# Patient Record
Sex: MALE | Race: WHITE | NOT HISPANIC OR LATINO
[De-identification: names, ages, dates, MRNs, and addresses within clinical notes are randomized per-mention and may not be internally consistent; named-entity substitution may affect disease eponyms.]

---

## 2020-12-13 ENCOUNTER — TRANSCRIPTION ENCOUNTER (OUTPATIENT)
Age: 65
End: 2020-12-13

## 2022-12-03 ENCOUNTER — EMERGENCY (EMERGENCY)
Facility: HOSPITAL | Age: 67
LOS: 1 days | Discharge: HOME | End: 2022-12-03
Attending: EMERGENCY MEDICINE | Admitting: EMERGENCY MEDICINE
Payer: MEDICARE

## 2022-12-03 VITALS
SYSTOLIC BLOOD PRESSURE: 143 MMHG | HEIGHT: 67 IN | DIASTOLIC BLOOD PRESSURE: 84 MMHG | RESPIRATION RATE: 17 BRPM | OXYGEN SATURATION: 98 % | HEART RATE: 55 BPM | TEMPERATURE: 98 F | WEIGHT: 214.95 LBS

## 2022-12-03 PROCEDURE — 99284 EMERGENCY DEPT VISIT MOD MDM: CPT

## 2022-12-03 NOTE — ED PROVIDER NOTE - PATIENT PORTAL LINK FT
You can access the FollowMyHealth Patient Portal offered by [facility] by registering at the following website: [email or web address]. By joining [employer]’s FollowMyHealth portal, you will also be able to view your health information using other applications (apps) compatible with our system.

## 2022-12-03 NOTE — ED PROVIDER NOTE - NS ED ROS FT
Constitutional: No fever. No chills.  Eyes: No redness. No discharge. No vision change. +floaters.  ENT: No sore throat. No ear pain.  MSK: No joint pain. No back pain.   Skin: No rash. No abrasions.   Neuro: No numbness. No weakness. No headache.  Psych: No known mental health issues.

## 2022-12-03 NOTE — ED ADULT TRIAGE NOTE - CHIEF COMPLAINT QUOTE
"I started to see floaters yesterday and I went to urgent care and they said I had no scratches to my left eye and should go to ER". Patient denies, dizziness, weakness, headache.

## 2022-12-03 NOTE — ED PROVIDER NOTE - CLINICAL SUMMARY MEDICAL DECISION MAKING FREE TEXT BOX
Hemodynamically stable. He denies headache, eye pain, or trauma to eye. He has no visual change and VA is intact. US notable for hyperechoic foci c/w vitreous hemorrhage, no evidence of retinal detachment. Discussed with pt. He has close ophthalmology follow up. Strict return precautions given.

## 2022-12-03 NOTE — ED PROVIDER NOTE - OBJECTIVE STATEMENT
68yo male with pmhx of HTN, HLD presents with floaters in left eye. Pt states he was watching TV last night when he noticed floaters described as "clear half tear drops" in the bottom of his field of vision. He denies preceding trauma or injury to the eye. He denies flashes of light, darkness or curtain over his vision, or vision change. He denies headache, eye pain, or nausea/vomiting. He has never had similar symptoms previously. He denies floaters in the right eye. 66yo male with pmhx of HTN, HLD presents with floaters in left eye. Pt states he was watching TV last night when he noticed floaters described as "clear half tear drops" in the bottom of his field of vision. He denies preceding trauma or injury to the eye. He denies flashes of light, darkness or curtain over his vision, or vision change. He denies headache, eye pain, or nausea/vomiting. He has never had similar symptoms previously. He denies floaters in the right eye. Pt went to urgent care prior to arrival where he had a normal fluorescein stain.

## 2022-12-03 NOTE — ED ADULT NURSE NOTE - OBJECTIVE STATEMENT
68 YO M here for left eye "floaters", started yesterday, went to urgent care where they dyed it to check for scratch which there was none.  pt reports hx of htn, hld, and afib, on xarelto.  on ARB, Statin, Multaq and Xarelto.  pt is A&OX4 ambulatory in [facility] and denies any associated symptoms.

## 2022-12-03 NOTE — ED PROVIDER NOTE - NSFOLLOWUPINSTRUCTIONS_ED_ALL_ED_FT
Your visual acuity today was:  LEFT EYE 20/20  RIGHT EYE 20/15    Your ultrasound today showed hyperechoic densities within the vitreous (bright spots), most consistent vitreous hemorrhage. There was no evidence of retinal detachment.    Please follow up as soon as possible with your ophthalmologist for re-evaluation.    Return to the Emergency Department if you develop eye pain, headache, acute visual change or any other concerns.      Eye Floaters      Eye floaters are spots in your vision caused by shadows from specks of material that float inside your eye. Floaters may be more obvious when you look up at the nicolle or at a bright, blank background.    Floaters do not usually cause vision problems, but it is still important to get an eye exam to make sure that they are not a sign of a more serious condition.      What are the causes?    In most cases, this condition is caused by age-related changes in the eye. As you age, the jelly-like fluid (vitreous) inside the eyeball shrinks and can become stringy. The stringy strands of vitreous cast shadows on the back of the eye (retina). These shadows show up as floaters in your vision.    Other possible causes of eye floaters include:  •A torn retina.      •Bleeding inside the eye. Diabetes and other conditions can cause blood vessels in the retina to bleed.      •A blood clot in the major vein of the retina or its branches (retinal vein occlusion).    •Separation (detachment) of the:  •Vitreous.      •Retina.        •Eye inflammation (uveitis).      •Eye infection.        What increases the risk?    You are more likely to develop this condition if:  •You are older. The risk increases with age.      •You have nearsightedness.      •You have diabetes.      •You have had cataracts removed, or other eye surgery.      •You have or have had an injury or trauma to the eye.        What are the signs or symptoms?  Field of vision with small floating spots and shapes.   The main symptom of this condition is the appearance of small, shadowy shapes moving across your vision. These shapes are called floaters. The shapes move as your eyes move. They often drift out of your central vision when you keep your eyes still. These shapes may look like:  •Specks.      •Dots.      •Circles.      •Squiggly lines.      •Thread.      Sometimes floaters appear along with flashes. Flashes usually occur at the edge of your vision. They may look like:  •Bursts of light.      •Flashing lights.      •Lightning streaks.      •What is commonly referred to as "stars."      In some cases, seeing flashes can be a sign of a torn or detached retina. This is a serious condition that requires emergency evaluation.      How is this diagnosed?    This condition may be diagnosed based on:  •Your symptoms and medical history.    •An exam by a health care provider who specializes in conditions and diseases of the eye (ophthalmologist). The exam may include:  •Putting eye drops in your eye to make the pupil wider (dilated). The pupil is the opening in the center of the eye.      •Checking the back of your eye with a microscope. This exam is the best way to determine whether your floaters are a normal part of aging or a warning sign of a more serious eye problem.        •Imaging tests such as an ultrasound. This may be needed if the specialist is unable to see well enough with the microscopes.        How is this treated?    Treatment for this condition may depend on the cause of the condition.  •If your floaters are the result of aging, you do not need treatment unless they start to affect your vision. Floaters often improve but may not go away completely. Most people adapt to the floaters or, over time, the floaters settle below the line of sight.      •If a retinal tear or detachment is causing your floaters, you may need surgery.      •If you have an underlying condition that is causing floaters, such as an infection, the floaters should go away when that condition is treated.      •In rare cases, if the floaters are affecting your vision, surgery to remove the vitreous and replace it with a saltwater solution (vitrectomy) may be considered.        Follow these instructions at home:    •Take over-the-counter and prescription medicines only as told by your health care provider.      • Do not drive if you have trouble seeing. Ask your health care provider for guidance about when it is safe for you to drive.      •Keep all follow-up visits. This is important.        Contact a health care provider if:    •You cannot see well because of your floaters.      •You develop any new symptoms.        Get help right away if:    •You develop a sudden increase in the number of floaters you see.      •You develop flashes along with floaters.      •It looks as if a curtain is blocking part of your vision.      •Your vision suddenly changes, or you lose your vision completely.      These symptoms may be an emergency. Get help right away. Call 911.   • Do not wait to see if the symptoms will go away.       • Do not drive yourself to the hospital.         Summary    •Eye floaters are spots in your vision caused by specks of material that float around inside your eye.      •In most cases, eye floaters are caused by age-related changes in the eye.      •Most people do not need treatment for eye floaters unless there is another condition causing the floaters, such as a retinal tear or detachment or an infection.      •If you develop a sudden increase in the number of floaters, see flashes, or notice a curtain blocking part of your vision, you should see an eye care provider right away.      This information is not intended to replace advice given to you by your health care provider. Make sure you discuss any questions you have with your health care provider.

## 2022-12-03 NOTE — ED PROVIDER NOTE - PHYSICAL EXAMINATION
VITAL SIGNS: I have reviewed nursing notes and confirm.  CONSTITUTIONAL: Well-developed; in no acute distress.   SKIN:  warm and dry, no acute rash.   HEAD:  normocephalic, atraumatic.  EYES: PERRL, EOM intact; conjunctiva and sclera clear. Lids everted without foreign body. VA: 20/20 OD, 20/15 OS.  ENT: No nasal discharge; airway clear.   NECK: Supple; non tender.  EXT: Normal ROM. No clubbing, cyanosis or edema. 2+ pulses to b/l ue/le.  NEURO: Alert, oriented, grossly unremarkable  PSYCH: Cooperative, mood and affect appropriate.

## 2022-12-04 PROCEDURE — 76512 OPH US DX B-SCAN: CPT | Mod: 26,LT

## 2022-12-04 PROCEDURE — 76512 OPH US DX B-SCAN: CPT

## 2022-12-04 PROCEDURE — 99284 EMERGENCY DEPT VISIT MOD MDM: CPT | Mod: 25

## 2022-12-06 DIAGNOSIS — I10 ESSENTIAL (PRIMARY) HYPERTENSION: ICD-10-CM

## 2022-12-06 DIAGNOSIS — H43.392 OTHER VITREOUS OPACITIES, LEFT EYE: ICD-10-CM

## 2022-12-06 DIAGNOSIS — Z91.013 ALLERGY TO SEAFOOD: ICD-10-CM

## 2022-12-06 DIAGNOSIS — E78.5 HYPERLIPIDEMIA, UNSPECIFIED: ICD-10-CM

## 2023-07-30 NOTE — ED PROVIDER NOTE - NS_EDPROVIDERDISPOUSERTYPE_ED_A_ED
None
I have personally evaluated and examined the patient. The Attending was available to me as a supervising provider if needed.

## 2024-03-12 PROBLEM — I10 ESSENTIAL (PRIMARY) HYPERTENSION: Chronic | Status: ACTIVE | Noted: 2022-12-03

## 2024-03-14 ENCOUNTER — OUTPATIENT (OUTPATIENT)
Dept: OUTPATIENT SERVICES | Facility: HOSPITAL | Age: 69
LOS: 1 days | End: 2024-03-14
Payer: MEDICARE

## 2024-03-14 ENCOUNTER — RESULT REVIEW (OUTPATIENT)
Age: 69
End: 2024-03-14

## 2024-03-14 ENCOUNTER — APPOINTMENT (OUTPATIENT)
Dept: ORTHOPEDIC SURGERY | Facility: CLINIC | Age: 69
End: 2024-03-14
Payer: MEDICARE

## 2024-03-14 VITALS
SYSTOLIC BLOOD PRESSURE: 142 MMHG | OXYGEN SATURATION: 98 % | DIASTOLIC BLOOD PRESSURE: 89 MMHG | HEART RATE: 51 BPM | BODY MASS INDEX: 30.61 KG/M2 | HEIGHT: 67 IN | WEIGHT: 195 LBS

## 2024-03-14 DIAGNOSIS — M17.11 UNILATERAL PRIMARY OSTEOARTHRITIS, RIGHT KNEE: ICD-10-CM

## 2024-03-14 PROCEDURE — 73564 X-RAY EXAM KNEE 4 OR MORE: CPT

## 2024-03-14 PROCEDURE — 73564 X-RAY EXAM KNEE 4 OR MORE: CPT | Mod: 26,50

## 2024-03-14 PROCEDURE — 99203 OFFICE O/P NEW LOW 30 MIN: CPT

## 2024-03-15 DIAGNOSIS — Z86.39 PERSONAL HISTORY OF OTHER ENDOCRINE, NUTRITIONAL AND METABOLIC DISEASE: ICD-10-CM

## 2024-03-15 DIAGNOSIS — Z80.42 FAMILY HISTORY OF MALIGNANT NEOPLASM OF PROSTATE: ICD-10-CM

## 2024-03-15 DIAGNOSIS — Z78.9 OTHER SPECIFIED HEALTH STATUS: ICD-10-CM

## 2024-03-15 DIAGNOSIS — Z80.1 FAMILY HISTORY OF MALIGNANT NEOPLASM OF TRACHEA, BRONCHUS AND LUNG: ICD-10-CM

## 2024-03-15 DIAGNOSIS — Z86.79 PERSONAL HISTORY OF OTHER DISEASES OF THE CIRCULATORY SYSTEM: ICD-10-CM

## 2024-03-15 DIAGNOSIS — Z84.1 FAMILY HISTORY OF DISORDERS OF KIDNEY AND URETER: ICD-10-CM

## 2024-03-15 PROBLEM — M17.11 PATELLOFEMORAL ARTHRITIS OF RIGHT KNEE: Status: ACTIVE | Noted: 2024-03-14

## 2024-03-15 RX ORDER — ATORVASTATIN CALCIUM 80 MG/1
TABLET, FILM COATED ORAL
Refills: 0 | Status: ACTIVE | COMMUNITY

## 2024-03-15 RX ORDER — RIVAROXABAN 2.5 MG/1
TABLET, FILM COATED ORAL
Refills: 0 | Status: ACTIVE | COMMUNITY

## 2024-03-15 RX ORDER — FLECAINIDE ACETATE 50 MG/1
TABLET ORAL
Refills: 0 | Status: ACTIVE | COMMUNITY

## 2024-03-15 RX ORDER — OLMESARTAN MEDOXOMIL 40 MG/1
TABLET, FILM COATED ORAL
Refills: 0 | Status: ACTIVE | COMMUNITY

## 2024-03-15 NOTE — END OF VISIT
[FreeTextEntry3] : All medical record entries made by GONZÁLEZ Cabrrea, acting as a scribe for this encounter under the direction of Remigio Chavez MD . I have reviewed the chart and agree that the record accurately reflects my personal performance of the history, physical exam, assessment and plan. I have also personally directed, reviewed, and agreed with the chart.

## 2024-03-15 NOTE — DISCUSSION/SUMMARY
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 He will begin a course of supervised PT. WE will see him back on an as needed basis. He or[place] call if any issues arise

## 2024-03-15 NOTE — HISTORY OF PRESENT ILLNESS
[de-identified] : Gagandeep Rodriguez is a 69 yo gentleman who presents with anterior right knee pain for the last week. He did a lot of walking at the [employer] and then went to the [employer] game at [facility] where he did a lot of stair climbing later that same day. He then developed anterior knee pain  and stiffness. He has had pain with stairs, particularly down stairs. He denies any swelling, locking or buckling. He denies any previous knee issues

## 2024-03-15 NOTE — PHYSICAL EXAM
[de-identified] : The patient is a well developed, well nourished male in no apparent distress. He is alert and oriented X 3 with a pleasant mood and appropriate affect.    On physical examination of the right knee, his ROM is 0-125 degrees. The patient walks with a normal gait and stands in neutral alignment. There is no effusion. No warmth or erythema is noted. The patella is tender to palpation medially and laterally. There is patellofemoral crepitus noted. The apprehension and grind tests are negative. The extensor mechanism is intact. There is no joint line tenderness. The Ashley sign is absent. The Lachman and pivot shift tests are negative. There is no varus or valgus laxity at 0 or 30 degrees. No posterolateral or anteromedial laxity is noted. No masses are palpable. No other soft tissue or bony tenderness is noted. There is some tenderness noted on palpation of the IT band. Quadriceps weakness is noted. Neurovascular function is intact.    [de-identified] : Radiographs of both knees show moderate lateral and PF DJD right knee